# Patient Record
Sex: FEMALE | Race: WHITE | NOT HISPANIC OR LATINO | Employment: UNEMPLOYED | URBAN - METROPOLITAN AREA
[De-identification: names, ages, dates, MRNs, and addresses within clinical notes are randomized per-mention and may not be internally consistent; named-entity substitution may affect disease eponyms.]

---

## 2019-03-26 ENCOUNTER — HOSPITAL ENCOUNTER (EMERGENCY)
Facility: CLINIC | Age: 54
Discharge: HOME | End: 2019-03-26
Attending: EMERGENCY MEDICINE | Admitting: EMERGENCY MEDICINE
Payer: COMMERCIAL

## 2019-03-26 ENCOUNTER — APPOINTMENT (OUTPATIENT)
Dept: GENERAL RADIOLOGY | Facility: CLINIC | Age: 54
End: 2019-03-26
Attending: EMERGENCY MEDICINE
Payer: COMMERCIAL

## 2019-03-26 ENCOUNTER — APPOINTMENT (OUTPATIENT)
Dept: ULTRASOUND IMAGING | Facility: CLINIC | Age: 54
End: 2019-03-26
Attending: EMERGENCY MEDICINE
Payer: COMMERCIAL

## 2019-03-26 VITALS
WEIGHT: 135 LBS | OXYGEN SATURATION: 100 % | DIASTOLIC BLOOD PRESSURE: 81 MMHG | BODY MASS INDEX: 21.69 KG/M2 | SYSTOLIC BLOOD PRESSURE: 116 MMHG | HEIGHT: 66 IN | RESPIRATION RATE: 18 BRPM | TEMPERATURE: 98.1 F | HEART RATE: 72 BPM

## 2019-03-26 DIAGNOSIS — M79.671 RIGHT FOOT PAIN: ICD-10-CM

## 2019-03-26 PROCEDURE — 25000132 ZZH RX MED GY IP 250 OP 250 PS 637: Performed by: EMERGENCY MEDICINE

## 2019-03-26 PROCEDURE — 93971 EXTREMITY STUDY: CPT | Mod: RT

## 2019-03-26 PROCEDURE — 73630 X-RAY EXAM OF FOOT: CPT | Mod: RT

## 2019-03-26 PROCEDURE — 99284 EMERGENCY DEPT VISIT MOD MDM: CPT | Mod: 25

## 2019-03-26 RX ORDER — ACETAMINOPHEN 500 MG
1000 TABLET ORAL ONCE
Status: COMPLETED | OUTPATIENT
Start: 2019-03-26 | End: 2019-03-26

## 2019-03-26 RX ORDER — CEPHALEXIN 500 MG/1
500 CAPSULE ORAL 4 TIMES DAILY
Qty: 28 CAPSULE | Refills: 0 | Status: SHIPPED | OUTPATIENT
Start: 2019-03-26 | End: 2019-04-02

## 2019-03-26 RX ADMIN — ACETAMINOPHEN 1000 MG: 500 TABLET, FILM COATED ORAL at 06:13

## 2019-03-26 SDOH — HEALTH STABILITY: MENTAL HEALTH: HOW OFTEN DO YOU HAVE A DRINK CONTAINING ALCOHOL?: NEVER

## 2019-03-26 ASSESSMENT — ENCOUNTER SYMPTOMS
MYALGIAS: 0
SHORTNESS OF BREATH: 0

## 2019-03-26 ASSESSMENT — MIFFLIN-ST. JEOR: SCORE: 1244.11

## 2019-03-26 NOTE — DISCHARGE INSTRUCTIONS
Discharge Instructions  Cellulitis    Cellulitis is an infection of the skin that occurs when bacteria enter the skin.   Symptoms are generally redness, swelling, warmth and pain.  Your infection appeared to be appropriate to treat at home with antibiotics.  However, sometimes your infection may be worse than it seemed at first, or may worsen with time. If you have new or worse symptoms, you may need to be seen again in the Emergency Department or by your primary provider.    Generally, every Emergency Department visit should have a follow-up clinic visit with either a primary or a specialty clinic/provider. Please follow-up as instructed by your emergency provider today.    Return to the Emergency Department if:  The redness, pain, or swelling gets a lot worse.  If the red area was marked, return if it is red significantly beyond the marked area.  You are unable to get your antibiotics, or are vomiting (throwing up) these pills, or you cannot take them.  You are feeling more ill, weak or lightheaded.  You start to run a new fever (temperature >101 F).  Anything else about the infection worries or concerns you.  Treatment:  Start your antibiotics right away, and take them as prescribed. Be sure to finish the whole prescription, even if you are better.  Apply a heating pad, warm packs, or warm water soaks to the infected area for 15 minutes at a time, at least 3 times a day. Do not use a heating pad on your feet or legs if you have diabetes. Do not sleep with a heating pad on, since this can cause burns or skin injury.  Rest your injured area for at least 1-2 days. After that you may start using your extremity again as long as there is not too much pain.   Raise the injured area above the level of your heart as much as possible in the first 1-2 days.  Tylenol  (acetaminophen), Motrin  (ibuprofen), or Advil  (ibuprofen) may help may help reduce pain and fever and may help you feel more comfortable. Be sure to read and  follow the package directions, and ask your provider if you have questions.    If you were given a prescription for medicine here today, be sure to read all of the information (including the package insert) that comes with your prescription.  This will include important information about the medicine, its side effects, and any warnings that you need to know about.  The pharmacist who fills the prescription can provide more information and answer questions you may have about the medicine.  If you have questions or concerns that the pharmacist cannot address, please call or return to the Emergency Department.     Remember that you can always come back to the Emergency Department if you are not able to see your regular provider in the amount of time listed above, if you get any new symptoms, or if there is anything that worries you.

## 2019-03-26 NOTE — ED PROVIDER NOTES
"  History     Chief Complaint:    Foot Pain       HPI   Maria Del Carmen Wallis is an otherwise healthy 53 year old female who presents to the ED for evaluation of foot pain. The patient states that she started to develop right foot pain to the lateral dorsal aspect of the foot in conjunction very mild swelling yesterday shortly after landing in [place] on a flight from [place]. She states that it felt almost as if she tied her shoes too tight. The pain got progressively worse throughout the night and is exacerbated when she bears weight or if she touches her foot. She denies any trauma or injury to the foot, calf pain, chest pain, shortness of breath, or history of blood clots    Allergies:  Penicillins     Medications:    The patient is currently on no regular medications.    Past Medical History:    The patient denies any significant past medical history.    Past Surgical History:    Hysterectomy     Family History:    No past pertinent family history.    Social History:  Negative for tobacco use.  Negative for alcohol use.     Review of Systems   Respiratory: Negative for shortness of breath.    Cardiovascular: Negative for chest pain.   Musculoskeletal: Negative for myalgias.        Foot pain with mild swelling   All other systems reviewed and are negative.        Physical Exam   First Vitals:  BP: 116/81  Pulse: 72  Temp: 98.1  F (36.7  C)  Resp: 18  Height: 167.6 cm (5' 6\")  Weight: 61.2 kg (135 lb)  SpO2: 100 %      Physical Exam  Constitutional: The patient is oriented to person, place, and time.   HENT:   Head: Atraumatic  Right Ear: Normal  Left Ear: Normal  Nose: Nose normal.   Mouth/Throat: Oropharynx is clear and moist. No erythema or exudate.   Eyes: Conjunctivae and EOM are normal. Pupils are equal, round, and reactive to light. No discharge  Neck: Normal range of motion. Neck supple.   Cardiovascular: Normal rate, regular rhythm, no murmur gallops or rubs. Intact distal pulses.    Pulmonary/Chest: CTA " bilaterally. No wheezes rale or rhonchi.  Abdominal: Soft. Non tender.  No masses   Musculoskeletal: No edema. No bony deformity. Normal range of motion. 3 cm diameter area of faint erythema on the lateral dorsal aspect of the right foot, markedly tender, mild swelling, no real warmth, normal pulses, normal movement of the toes, no tenderness at the ankle.  Lymphadenopathy:     The patient has no cervical adenopathy.   Neurological: The patient is alert and oriented to person, place, and time. The patient has normal strength and normal reflexes. No cranial nerve deficit. Coordination normal.   Skin: Skin is warm and dry. No rash noted. The patient is not diaphoretic.   Psychiatric: The patient has a normal mood and affect.      Emergency Department Course   Imaging:  Radiographic findings were communicated with the patient who voiced understanding of the findings.  XR Foot, G/E, 3 views, right:   No fracture, dislocation, or retained radiopaque foreign  body as per radiology.     US Lower Extremity Venous Duplex, right:   No DVT demonstrated as per radiology.    Interventions:  06 Tylenol 1,000 mg PO    Emergency Department Course:  Nursing notes and vitals reviewed. (0521) I performed an exam of the patient as documented above.     Medicine administered as documented above.    The patient was sent for a foot XR and lower extremity venous duplex US while in the emergency department, findings above.     0617 I rechecked the patient and discussed the results of her workup thus far.     Findings and plan explained to the Patient. Patient discharged home with instructions regarding supportive care, medications, and reasons to return. The importance of close follow-up was reviewed. The patient was prescribed Keflex.     Impression & Plan    Medical Decision Makin year old female presents with right foot pain. Unsure as to the etiology of the patient's discomfort. She does have a small area of faint erythema that  is exquisitely tender. She does not recall and trauma to the area. XR of the foot does not show any bony abnormality. Because of her recent airline trip I did consider DVT; US is negative. At this point, she is afebrile, vital signs are normal, feel she can be safely discharged to home. Foot was ACE wrapped, will place her on crutches for the next couple of days, ice and elevated the foot. Tylenol and Motrin for pain. Because of the area of erythema and potential for early cellulitis will place her on Keflex for the next week. Follow up with PCP. Return for problems.       Diagnosis:    ICD-10-CM    1. Right foot pain M79.671        Disposition:  discharged to home    Discharge Medications:     Medication List      Started    cephALEXin 500 MG capsule  Commonly known as:  KEFLEX  500 mg, Oral, 4 TIMES DAILY          Scribe Disclosure:  IAayush, am serving as a scribe on 3/26/2019 at 5:21 AM to personally document services performed by Clive Eason MD based on my observations and the provider's statements to me.          Aayush Pollard  3/26/2019    EMERGENCY DEPARTMENT       Clive Eason MD  04/03/19 3653

## 2019-03-26 NOTE — ED AVS SNAPSHOT
Emergency Department  [address] [place] [address]  Phone:  [phone]  Fax:  [phone]                                    Maria Del Carmen Wallis   MRN: [id number]    Department:   Emergency Department   Date of Visit:  3/26/2019           After Visit Summary Signature Page    I have received my discharge instructions, and my questions have been answered. I have discussed any challenges I see with this plan with the nurse or doctor.    ..........................................................................................................................................  Patient/Patient Representative Signature      ..........................................................................................................................................  Patient Representative Print Name and Relationship to Patient    ..................................................               ................................................  Date                                   Time    ..........................................................................................................................................  Reviewed by Signature/Title    ...................................................              ..............................................  Date                                               Time          22EPIC Rev 08/18

## 2019-11-04 ENCOUNTER — HEALTH MAINTENANCE LETTER (OUTPATIENT)
Age: 54
End: 2019-11-04

## 2020-11-22 ENCOUNTER — HEALTH MAINTENANCE LETTER (OUTPATIENT)
Age: 55
End: 2020-11-22

## 2021-02-07 ENCOUNTER — HEALTH MAINTENANCE LETTER (OUTPATIENT)
Age: 56
End: 2021-02-07

## 2021-04-19 ENCOUNTER — IMMUNIZATION (OUTPATIENT)
Dept: PEDIATRICS | Facility: CLINIC | Age: 56
End: 2021-04-19
Payer: COMMERCIAL

## 2021-04-19 PROCEDURE — 91300 PR COVID VAC PFIZER DIL RECON 30 MCG/0.3 ML IM: CPT

## 2021-04-19 PROCEDURE — 0001A PR COVID VAC PFIZER DIL RECON 30 MCG/0.3 ML IM: CPT

## 2021-05-10 ENCOUNTER — IMMUNIZATION (OUTPATIENT)
Dept: PEDIATRICS | Facility: CLINIC | Age: 56
End: 2021-05-10
Attending: INTERNAL MEDICINE
Payer: COMMERCIAL

## 2021-05-10 PROCEDURE — 91300 PR COVID VAC PFIZER DIL RECON 30 MCG/0.3 ML IM: CPT

## 2021-05-10 PROCEDURE — 0002A PR COVID VAC PFIZER DIL RECON 30 MCG/0.3 ML IM: CPT

## 2021-09-18 ENCOUNTER — HEALTH MAINTENANCE LETTER (OUTPATIENT)
Age: 56
End: 2021-09-18

## 2021-09-26 ENCOUNTER — HOSPITAL ENCOUNTER (EMERGENCY)
Facility: CLINIC | Age: 56
Discharge: HOME | End: 2021-09-26
Attending: EMERGENCY MEDICINE | Admitting: EMERGENCY MEDICINE
Payer: COMMERCIAL

## 2021-09-26 ENCOUNTER — APPOINTMENT (OUTPATIENT)
Dept: GENERAL RADIOLOGY | Facility: CLINIC | Age: 56
End: 2021-09-26
Attending: EMERGENCY MEDICINE
Payer: COMMERCIAL

## 2021-09-26 VITALS
RESPIRATION RATE: 18 BRPM | BODY MASS INDEX: 21.69 KG/M2 | DIASTOLIC BLOOD PRESSURE: 69 MMHG | HEART RATE: 63 BPM | HEIGHT: 66 IN | WEIGHT: 135 LBS | TEMPERATURE: 98 F | SYSTOLIC BLOOD PRESSURE: 118 MMHG | OXYGEN SATURATION: 100 %

## 2021-09-26 DIAGNOSIS — S20.219A CONTUSION OF RIB, UNSPECIFIED LATERALITY, INITIAL ENCOUNTER: ICD-10-CM

## 2021-09-26 PROCEDURE — 71046 X-RAY EXAM CHEST 2 VIEWS: CPT

## 2021-09-26 PROCEDURE — 99283 EMERGENCY DEPT VISIT LOW MDM: CPT | Mod: 25

## 2021-09-26 RX ORDER — LIDOCAINE 50 MG/G
1 PATCH TOPICAL EVERY 24 HOURS
Qty: 7 PATCH | Refills: 0 | Status: SHIPPED | OUTPATIENT
Start: 2021-09-26 | End: 2021-12-23

## 2021-09-26 ASSESSMENT — MIFFLIN-ST. JEOR: SCORE: 1234.11

## 2021-09-26 NOTE — ED TRIAGE NOTES
Pt presents with rib pain and epigastric area- pt states on 9/11 she was running a [employer] race and was running up a half pipe and fell onto the top of the half pipe and landed hard on her rib area and having a lot of pain with any touch, movement and unable to do any physical activity due to the pain. Denies coughing up any blood

## 2021-09-26 NOTE — DISCHARGE INSTRUCTIONS
As we talked about, you can take Motrin with more frequency if you like, try 600 mg, every 6 hours as needed.  Please also use Tylenol and the lidocaine patch was given you.  Come back with any other concerns, and know that it will probably take between 4 and 6 weeks to fully heal from your contusion.

## 2021-09-26 NOTE — ED PROVIDER NOTES
"  History   Chief Complaint:  Rib Pain       HPI   Maria Del Carmen Wallis is a 55 year old female who presents with rib pain. Per the patient, on 9/11 she was running a [employer] race and ran up a half pipe. She slipped and fell onto her chest on the edge of the half pipe, and states she was hit in the ribs. She developed rib pain worsened by movement, coughing, sneezing, and deep breaths. She was attempting to wait out the pain and has been taking ibuprofen with moderate relief. While at the ER, she complains of ongoing rib pain.    Review of Systems   Musculoskeletal:        Positive for rib pain   All other systems reviewed and are negative.        Allergies:  Penicillins    Medications:  Albuterol  Flonase  Imitrex    Past Medical History:    The patient denies past medical history.    Past Surgical History:    Hysterectomy    Family History:    Mother: Breast cancer    Social History:  The patient was accompanied to the ER by her spouse  Marital Status:   The patient recently ran a [employer] [place] race    Physical Exam     Patient Vitals for the past 24 hrs:   BP Temp Pulse Resp SpO2 Height Weight   09/26/21 0913 -- -- -- -- -- 1.676 m (5' 6\") 61.2 kg (135 lb)   09/26/21 0912 118/69 98  F (36.7  C) 63 20 100 % -- --       Physical Exam  Vitals: reviewed by me  General: Pt seen on [facility], cooperative, and alert to conversation  Eyes: Tracking well, clear conjunctiva BL  ENT: MMM, midline trachea.   Lungs: No tachypnea, no accessory muscle use. No respiratory distress.   CV: Rate as above  Abd: Soft, non tender, no guarding, no rebound. Non distended  MSK: no joint effusion.  No evidence of trauma apart from a small chronic area mentioned below  Does however have focal tenderness palpation to her lowest rib on the right side, anteriorly.  Minimal ecchymotic area noted over this.  No skin breaks.  Skin: No rash  Neuro: Clear speech and no facial droop.  Psych: Not RIS, no e/o " /        Emergency Department Course     Imaging:    XR Chest 2 Views  Negative chest.  Reading per radiology.    The above imaging workup was performed.     Emergency Department Course:    Reviewed:  I reviewed nursing notes, vitals and past medical history    Assessments:  0952 I obtained history and examined the patient as noted above.     Disposition:  The patient was discharged to home.       Impression & Plan       Medical Decision Making:  Maria Del Carmen Wallis is a very pleasant 55 year old female who presents to the emergency room with what appears to be bruised ribs, specifically her lower interior ribs. She shows me a video from about two weeks ago where she fell and landed with a rather hard blunt impact on a half pipe bar. She does have some ecchymotic area over these lower ribs, as well as discrete and focal tenderness to palpation. Xray does not show any fractures. She is able to take deep breaths as long as she goes slowly, and I do think that she is stable for continued outpatient management with Tylenol and Motrin, as well as a lidocaine patch as this may help somewhat. She understands that she will likely have pain for 4-6 weeks, does feel comfortable coming back to the emergency department if anything changes, and will be discharged as above.     Diagnosis:    ICD-10-CM    1. Contusion of rib, unspecified laterality, initial encounter  S20.219A        Discharge Medications:  New Prescriptions    LIDOCAINE (LIDODERM) 5 % PATCH    Place 1 patch onto the skin every 24 hours To prevent lidocaine toxicity, patient should be patch free for 12 hrs daily.       Scribe Disclosure:  I, Basilio Mcdowell, am serving as a scribe at 9:18 AM on 9/26/2021 to document services personally performed by Bogdan Moyer MD based on my observations and the provider's statements to me.        Bogdan Moyer MD  09/26/21 1669

## 2021-12-23 ENCOUNTER — LAB (OUTPATIENT)
Dept: URGENT CARE | Facility: URGENT CARE | Age: 56
End: 2021-12-23
Attending: EMERGENCY MEDICINE
Payer: COMMERCIAL

## 2021-12-23 ENCOUNTER — OFFICE VISIT (OUTPATIENT)
Dept: URGENT CARE | Facility: URGENT CARE | Age: 56
End: 2021-12-23
Payer: COMMERCIAL

## 2021-12-23 ENCOUNTER — E-VISIT (OUTPATIENT)
Dept: URGENT CARE | Facility: CLINIC | Age: 56
End: 2021-12-23
Payer: COMMERCIAL

## 2021-12-23 VITALS
TEMPERATURE: 98.2 F | SYSTOLIC BLOOD PRESSURE: 100 MMHG | DIASTOLIC BLOOD PRESSURE: 74 MMHG | WEIGHT: 135 LBS | OXYGEN SATURATION: 100 % | RESPIRATION RATE: 16 BRPM | BODY MASS INDEX: 21.79 KG/M2 | HEART RATE: 60 BPM

## 2021-12-23 DIAGNOSIS — J06.9 VIRAL URI WITH COUGH: Primary | ICD-10-CM

## 2021-12-23 DIAGNOSIS — R05.9 COUGH: ICD-10-CM

## 2021-12-23 DIAGNOSIS — Z20.822 SUSPECTED COVID-19 VIRUS INFECTION: Primary | ICD-10-CM

## 2021-12-23 DIAGNOSIS — Z20.822 SUSPECTED COVID-19 VIRUS INFECTION: ICD-10-CM

## 2021-12-23 LAB
DEPRECATED S PYO AG THROAT QL EIA: NEGATIVE
FLUAV AG SPEC QL IA: NEGATIVE
FLUBV AG SPEC QL IA: NEGATIVE
GROUP A STREP BY PCR: NOT DETECTED

## 2021-12-23 PROCEDURE — 99421 OL DIG E/M SVC 5-10 MIN: CPT | Performed by: EMERGENCY MEDICINE

## 2021-12-23 PROCEDURE — 87804 INFLUENZA ASSAY W/OPTIC: CPT | Performed by: NURSE PRACTITIONER

## 2021-12-23 PROCEDURE — 99203 OFFICE O/P NEW LOW 30 MIN: CPT | Performed by: NURSE PRACTITIONER

## 2021-12-23 PROCEDURE — U0005 INFEC AGEN DETEC AMPLI PROBE: HCPCS

## 2021-12-23 PROCEDURE — U0003 INFECTIOUS AGENT DETECTION BY NUCLEIC ACID (DNA OR RNA); SEVERE ACUTE RESPIRATORY SYNDROME CORONAVIRUS 2 (SARS-COV-2) (CORONAVIRUS DISEASE [COVID-19]), AMPLIFIED PROBE TECHNIQUE, MAKING USE OF HIGH THROUGHPUT TECHNOLOGIES AS DESCRIBED BY CMS-2020-01-R: HCPCS

## 2021-12-23 PROCEDURE — 87651 STREP A DNA AMP PROBE: CPT | Performed by: NURSE PRACTITIONER

## 2021-12-23 NOTE — PATIENT INSTRUCTIONS
Dear Maria Del Carmen Wallis,    Your symptoms show that you may have coronavirus (COVID-19). This illness can cause fever, cough and trouble breathing. Many people get a mild case and get better on their own. Some people can get very sick.    Will I be tested for COVID-19?  We would like to test you for Covid-19 virus. I have placed orders for this test.     To schedule: go to your [employer] home page and scroll down to the section that says  You have an appointment that needs to be scheduled  and click the large green button that says  Schedule Now  and follow the steps to find the next available openings.    If you are unable to complete these [employer] scheduling steps, please call [phone] to schedule your testing.     Return to work/school/ guidance:  Please let your workplace manager and staffing office know when your quarantine ends     We can t give you an exact date as it depends on the above. You can calculate this on your own or work with your manager/staffing office to calculate this. (For example if you were exposed on 10/4, you would have to quarantine for 14 full days. That would be through 10/18. You could return on 10/19.)      If you receive a positive COVID-19 test result, follow the guidance of the those who are giving you the results. Usually the return to work is 10 (or in some cases 20 days from symptom onset.) If you work at [facility], you must also be cleared by Employee Occupational Health and Safety to return to work.        If you receive a negative COVID-19 test result and did not have a high risk exposure to someone with a known positive COVID-19 test, you can return to work once you're free of fever for 24 hours without fever-reducing medication and your symptoms are improving or resolved.      If you receive a negative COVID-19 test and If you had a high risk exposure to someone who has tested positive for COVID-19 then you can return to work 14 days after your last contact  with the positive individual    Note: If you have ongoing exposure to the covid positive person, this quarantine period may be more than 14 days. (For example, if you are continued to be exposed to your child who tested positive and cannot isolate from them, then the quarantine of 7-14 days can't start until your child is no longer contagious. This is typically 10 days from onset of the child's symptoms. So the total duration may be 17-24 days in this case.)    Sign up for [employer].   We know it's scary to hear that you might have COVID-19. We want to track your symptoms to make sure you're okay over the next 2 weeks. Please look for an email from [employer]--this is a free, online program that we'll use to keep in touch. To sign up, follow the link in the email you will receive. Learn more at [email or web address]    How can I take care of myself?    Get lots of rest. Drink extra fluids (unless a doctor has told you not to)    Take Tylenol (acetaminophen) or ibuprofen for fever or pain. If you have liver or kidney problems, ask your family doctor if it's okay to take Tylenol o ibuprofen    If you have other health problems (like cancer, heart failure, an organ transplant or severe kidney disease): Call your specialty clinic if you don't feel better in the next 2 days.    Know when to call 911. Emergency warning signs include:  o Trouble breathing or shortness of breath  o Pain or pressure in the chest that doesn't go away  o Feeling confused like you haven't felt before, or not being able to wake up  o Bluish-colored lips or face    Where can I get more information?  [facility] - About COVID-19:   [email or web address]    CDC - What to Do If You're Sick:   www.cdc.gov/coronavirus/2019-ncov/about/steps-when-sick.html    December 23, 2021  RE:  Maria Del Carmen Wallis                                                                                                                  [id number]  [place] [address]      To whom it may concern:    I evaluated Maria Del Carmen Wallis on December 23, 2021. Maria Del Carmen Wallis should be excused from work/school.     They should let their workplace manager and staffing office know when their quarantine ends.    We can not give an exact date as it depends on the information below. They can calculate this on their own or work with their manager/staffing office to calculate this. (For example if they were exposed on 10/04, they would have to quarantine for 14 full days. That would be through 10/18. They could return on 10/19.)    Quarantine Guidelines:      If patient receives a positive COVID-19 test result, they should follow the guidance of those who are giving the results. Usually the return to work is 10 (or in some cases 20 days from symptom onset.) If they work at [employer], they must be cleared by Employee Occupational Health and Safety to return to work.        If patient receives a negative COVID-19 test result and did not have a high risk exposure to someone with a known positive COVID-19 test, they can return to work once they're free of fever for 24 hours without fever-reducing medication and their symptoms are improving or resolved.      If patient receives a negative COVID-19 test and if they had a high risk exposure to someone who has tested positive for COVID-19 then they can return to work 14 days after their last contact with the positive individual    Note: If there is ongoing exposure to the covid positive person, this quarantine period may be longer than 14 days. (For example, if they are continually exposed to their child, who tested positive and cannot isolate from them, then the quarantine of 7-14 days can't start until their child is no longer contagious. This is typically 10 days from onset to the child's symptoms. So the total duration may be 17-24 days in this case.)     Sincerely,  Amrit Quinn MD

## 2021-12-23 NOTE — PROGRESS NOTES
Chief Complaint   Patient presents with     Cough     cough, body aches, sore throat, SOB X 2 days         ICD-10-CM    1. Viral URI with cough  J06.9    2. Cough  R05.9 Streptococcus A Rapid Screen w/Reflex to PCR     Influenza A/B antigen     Group A Streptococcus PCR Throat Swab   Dextromethorphan for cough (Delsym cough syrup).    Pseudoephedrine for runny nose/ congestion.    Acetaminophen (Tylenol) may be taken up to 4000mg daily (3000mg if over 65) which would be 2 regular strength tablets (325mg) or two extra strength tablets(500mg) up to 4 times a day (3 times a day if over 65).   Check for acetaminophen in other OTC or prescription medications and be sure you add this in the maximum amount you take every day.    Too much acetaminophen can lead to serious liver damage. DO NOT TAKE if you have a history of liver disease.    Ibuprofen 200mg tablets may be taken up to 4 pills 4 times a day(three times a day if over 65)  to the maximum of 3200mg,  (2400mg if >65)  daily as needed for pain.   Take with food.  Don't take with aspirin, Aleve or other antiinflammatory medication or with warfarin. DO NOT TAKE if you have a history of kidney disease.    Drink water and rest. Isolate at home until you have had symptoms for at least 10 days. Patient is instructed to go to emergency room immediately if she develops severe shortness of breath.      [facility] website for Monoclonal antibodies if test is positive.    33 minutes spent on the date of the encounter doing chart review, history and exam, documentation and further activities per the note      Results for orders placed or performed in visit on 12/23/21 (from the past 24 hour(s))   Streptococcus A Rapid Screen w/Reflex to PCR    Specimen: Throat; Swab   Result Value Ref Range    Group A Strep antigen Negative Negative   Influenza A/B antigen    Specimen: Nasopharyngeal; Swab   Result Value Ref Range    Influenza A antigen Negative Negative     Influenza B antigen Negative Negative    Narrative    Test results must be correlated with clinical data. If necessary, results should be confirmed by a molecular assay or viral culture.   Group A Streptococcus PCR Throat Swab    Specimen: Throat; Swab   Result Value Ref Range    Group A strep by PCR Not Detected Not Detected    Narrative    The Xpert Xpress Strep A test, performed on the [employer], is a rapid, qualitative in vitro diagnostic test for the detection of Streptococcus pyogenes (Group A ß-hemolytic Streptococcus, Strep A) in throat swab specimens from patients with signs and symptoms of pharyngitis. The Xpert Xpress Strep A test can be used as an aid in the diagnosis of Group A Streptococcal pharyngitis. The assay is not intended to monitor treatment for Group A Streptococcus infections. The Xpert Xpress Strep A test utilizes an automated real-time polymerase chain reaction (PCR) to detect Streptococcus pyogenes DNA.       Filomena Wallis is an 56 year old female who presents to clinic today for ST, cough, fever, headache, body aches, , runny nose, severe fatigue.     ROS: 10 point ROS neg other than the symptoms noted above in the HPI.       Objective    /74   Pulse 60   Temp 98.2  F (36.8  C) (Tympanic)   Resp 16   Wt 61.2 kg (135 lb)   SpO2 100%   BMI 21.79 kg/m    Nurses notes and VS have been reviewed.    Physical Exam       GENERAL APPEARANCE: alert and moderate distress     EYES: PERRL, EOMI, sclera non-icteric     HENT: ear canals and TM's normal, nose and mouth without ulcers or lesions, tonsillar erythema and tiny ulcerations in the back of throat.     NECK: no adenopathy or asymmetry, thyroid normal to palpation     RESP: lungs clear to auscultation - no rales, rhonchi or wheezes     CV: regular rates and rhythm, no murmurs, rubs, or gallop     ABDOMEN:  soft, nontender, no HSM or masses and bowel sounds normal     MS: extremities normal- no gross  deformities noted; normal muscle tone.     SKIN: no suspicious lesions or rashes     NEURO: Normal strength and tone, mentation intact and speech normal     PSYCH: normal thought process; no significant mood disturbance    Patient Instructions   Dextromethorphan for cough (Delsym cough syrup).    Pseudoephedrine for runny nose/ congestion.    Acetaminophen (Tylenol) may be taken up to 4000mg daily (3000mg if over 65) which would be 2 regular strength tablets (325mg) or two extra strength tablets(500mg) up to 4 times a day (3 times a day if over 65).   Check for acetaminophen in other OTC or prescription medications and be sure you add this in the maximum amount you take every day.    Too much acetaminophen can lead to serious liver damage. DO NOT TAKE if you have a history of liver disease.    Ibuprofen 200mg tablets may be taken up to 4 pills 4 times a day(three times a day if over 65)  to the maximum of 3200mg,  (2400mg if >65)  daily as needed for pain.   Take with food.  Don't take with aspirin, Aleve or other antiinflammatory medication or with warfarin. DO NOT TAKE if you have a history of kidney disease.    Drink water and rest.      [facility] website for Monoclonal antibodies.    After Your COVID-19 (Coronavirus) Test  You have been tested for COVID-19 (coronavirus).   If you'll have surgery in the next few days, we'll let you know ahead of time if you have the virus. Please call your surgeon's office with any questions.  For all other patients: Results are usually available in [employer] within 2 to 3 days.   If you do not have a [employer] account, you'll get a letter in the mail in about 7 to 10 days.   [employer] is often the fastest way to get test results. Please sign up if you do not already have a [employer] account. See the handout Getting COVID-19 Test Results in [employer] for help.  What if my test result is positive?  If your test is positive and you have not viewed your result in [employer], you'll  "get a phone call with your result. (A positive test means that you have the virus.)     Follow the tips under \"How do I self-isolate?\" below for 10 days (20 days if you have a weak immune system).    You don't need to be retested for COVID-19 before going back to school or work. As long as you're fever-free and feeling better, you can go back to school, work and other activities after waiting the 10 or 20 days.  What if I have questions after I get my results?  If you have questions about your results, please visit our testing website at [email or web address].   After 7 to 10 days, if you have not gotten your results:     Call [phone] ([phone]) and ask to speak with our COVID-19 results team.    If you're being treated at an infusion center: Call your infusion center directly.  What are the symptoms of COVID-19?  Cough, fever and trouble breathing are the most common signs of COVID-19.  Other symptoms can include new headaches, new muscle or body aches, new and unexplained fatigue (feeling very tired), chills, sore throat, congestion (stuffy or runny nose), diarrhea (loose poop), loss of taste or smell, belly pain, and nausea or vomiting (feeling sick to your stomach or throwing up).  You may already have symptoms of COVID-19, or they may show up later.  What should I do if I have symptoms?  If you're having surgery: Call your surgeon's office.  For all other patients: Stay home and away from others (self-isolate) until ...    You've had no fever--and no medicine that reduces fever--for 1 full day (24 hours), AND    Other symptoms have gotten better. For example, your cough or breathing has improved, AND    At least 10 days have passed since your symptoms first started.  How do I self-isolate?  1. Stay in your own room, even for meals. Use your own bathroom if you can.  2. Stay away from others in your home. No hugging, kissing or shaking hands. No visitors.  3. Don't go to " "work, school or anywhere else.  4. Clean \"high touch\" surfaces often (doorknobs, counters, handles). Use household cleaning spray or wipes. You'll find a full list of  on the EPA website: www.epa.gov/pesticide-registration/list-n-disinfectants-use-against-sars-cov-2.  5. Cover your mouth and nose with a mask or other face covering to avoid spreading germs.  6. Wash your hands and face often. Use soap and water.  7. Caregivers in these groups are at risk for severe illness due to COVID-19:  1. People 65 years and older  2. People who live in a nursing home or long-term care facility  3. People with chronic disease (lung, heart, cancer, diabetes, kidney, liver, immunologic)  4. People who have a weakened immune system, including those who:    Are in cancer treatment    Take medicine that weakens the immune system, such as corticosteroids    Had a bone marrow or organ transplant    Have an immune deficiency    Have poorly controlled HIV or AIDS    Are obese (body mass index of 40 or higher)    Smoke regularly  8. Caregivers should wear gloves while washing dishes, handling laundry and cleaning bedrooms and bathrooms.  9. Use caution when washing and drying laundry: Don't shake dirty laundry and use the warmest water setting that you can.  10. For more tips on managing your health at home, go to www.cdc.gov/coronavirus/2019-ncov/downloads/10Things.pdf.  How can I take care of myself at home?  1. Get lots of rest. Drink extra fluids (unless a doctor has told you not to).  2. Take Tylenol (acetaminophen) for fever or pain. If you have liver or kidney problems, ask your family doctor if it's OK to take Tylenol.   Adults can take either:  ? 650 mg (two 325 mg pills) every 4 to 6 hours, or   ? 1,000 mg (two 500 mg pills) every 8 hours as needed.  ? Note: Don't take more than 3,000 mg in one day. Acetaminophen is found in many medicines (both prescribed and over-the-counter medicines). Read all labels to be sure you " don't take too much.   For children, check the Tylenol bottle for the right dose. The dose is based on the child's age or weight.  3. If you have other health problems (like cancer, heart failure, an organ transplant or severe kidney disease): Call your specialty clinic if you don't feel better in the next 2 days.  4. Know when to call 911. Emergency warning signs include:  ? Trouble breathing or shortness of breath  ? Chest pain or pressure that doesn't go away  ? Feeling confused like you haven't felt before, or not being able to wake up  ? Bluish-colored lips or face  5. If your doctor prescribed a blood thinner medicine: Follow their instructions.  Where can I get more information?  1. [facility] - About COVID-19:   [email or web address]  2. CDC - If You're Sick: cdc.gov/coronavirus/2019-ncov/about/steps-when-sick.html  3. CDC - Ending Home Isolation: www.cdc.gov/coronavirus/2019-ncov/hcp/disposition-in-home-patients.html  4. [facility] - Caring for Someone: www.cdc.gov/coronavirus/2019-ncov/if-you-are-sick/care-for-someone.html  5. [facility] - Interim Guidance for Hospital Discharge to Home: www.health.[facility].[place]/diseases/coronavirus/hcp/hospdischarge.pdf  6. [facility] clinical trials (COVID-19 research studies): clinicalaffairs.[facility]-clinical-trials  7. Below are the COVID-19 hotlines at the [facility] of Health ([facility]). Interpreters are available.  ? For health questions: Call 988-970-8120 or [phone] (7 a.m. to 7 p.m.)  ? For questions about schools and childcare: Call 087-119-3425 or [phone] (7 a.m. to 7 p.m.)    For informational purposes only. Not to replace the advice of your health care provider. Clinically reviewed by Infection Prevention and the [facility] COVID-19 Clinical Team. Copyright   2020 [employer]. All rights reserved. [employer] 044769 - Rev 11/11/20.               BUDDY Delcid, CNP  [facility]  Provider    The use of Dragon/[employer] dictation services may have been used to construct the content in this note; any grammatical or spelling errors are non-intentional. Please contact the author of this note directly if you are in need of any clarification.

## 2021-12-23 NOTE — PATIENT INSTRUCTIONS
"Dextromethorphan for cough (Delsym cough syrup).    Pseudoephedrine for runny nose/ congestion.    Acetaminophen (Tylenol) may be taken up to 4000mg daily (3000mg if over 65) which would be 2 regular strength tablets (325mg) or two extra strength tablets(500mg) up to 4 times a day (3 times a day if over 65).   Check for acetaminophen in other OTC or prescription medications and be sure you add this in the maximum amount you take every day.    Too much acetaminophen can lead to serious liver damage. DO NOT TAKE if you have a history of liver disease.    Ibuprofen 200mg tablets may be taken up to 4 pills 4 times a day(three times a day if over 65)  to the maximum of 3200mg,  (2400mg if >65)  daily as needed for pain.   Take with food.  Don't take with aspirin, Aleve or other antiinflammatory medication or with warfarin. DO NOT TAKE if you have a history of kidney disease.    Drink water and rest.      [facility] website for Monoclonal antibodies.    After Your COVID-19 (Coronavirus) Test  You have been tested for COVID-19 (coronavirus).   If you'll have surgery in the next few days, we'll let you know ahead of time if you have the virus. Please call your surgeon's office with any questions.  For all other patients: Results are usually available in [employer] within 2 to 3 days.   If you do not have a [employer] account, you'll get a letter in the mail in about 7 to 10 days.   [employer] is often the fastest way to get test results. Please sign up if you do not already have a [employer] account. See the handout Getting COVID-19 Test Results in [employer] for help.  What if my test result is positive?  If your test is positive and you have not viewed your result in [employer], you'll get a phone call with your result. (A positive test means that you have the virus.)     Follow the tips under \"How do I self-isolate?\" below for 10 days (20 days if you have a weak immune system).    You don't need to be retested for COVID-19 " "before going back to school or work. As long as you're fever-free and feeling better, you can go back to school, work and other activities after waiting the 10 or 20 days.  What if I have questions after I get my results?  If you have questions about your results, please visit our testing website at [email or web address].   After 7 to 10 days, if you have not gotten your results:     Call [phone] ([phone]) and ask to speak with our COVID-19 results team.    If you're being treated at an infusion center: Call your infusion center directly.  What are the symptoms of COVID-19?  Cough, fever and trouble breathing are the most common signs of COVID-19.  Other symptoms can include new headaches, new muscle or body aches, new and unexplained fatigue (feeling very tired), chills, sore throat, congestion (stuffy or runny nose), diarrhea (loose poop), loss of taste or smell, belly pain, and nausea or vomiting (feeling sick to your stomach or throwing up).  You may already have symptoms of COVID-19, or they may show up later.  What should I do if I have symptoms?  If you're having surgery: Call your surgeon's office.  For all other patients: Stay home and away from others (self-isolate) until ...    You've had no fever--and no medicine that reduces fever--for 1 full day (24 hours), AND    Other symptoms have gotten better. For example, your cough or breathing has improved, AND    At least 10 days have passed since your symptoms first started.  How do I self-isolate?  1. Stay in your own room, even for meals. Use your own bathroom if you can.  2. Stay away from others in your home. No hugging, kissing or shaking hands. No visitors.  3. Don't go to work, school or anywhere else.  4. Clean \"high touch\" surfaces often (doorknobs, counters, handles). Use household cleaning spray or wipes. You'll find a full list of  on the EPA website: " www.epa.gov/pesticide-registration/list-n-disinfectants-use-against-sars-cov-2.  5. Cover your mouth and nose with a mask or other face covering to avoid spreading germs.  6. Wash your hands and face often. Use soap and water.  7. Caregivers in these groups are at risk for severe illness due to COVID-19:  1. People 65 years and older  2. People who live in a nursing home or long-term care facility  3. People with chronic disease (lung, heart, cancer, diabetes, kidney, liver, immunologic)  4. People who have a weakened immune system, including those who:    Are in cancer treatment    Take medicine that weakens the immune system, such as corticosteroids    Had a bone marrow or organ transplant    Have an immune deficiency    Have poorly controlled HIV or AIDS    Are obese (body mass index of 40 or higher)    Smoke regularly  8. Caregivers should wear gloves while washing dishes, handling laundry and cleaning bedrooms and bathrooms.  9. Use caution when washing and drying laundry: Don't shake dirty laundry and use the warmest water setting that you can.  10. For more tips on managing your health at home, go to www.cdc.gov/coronavirus/2019-ncov/downloads/10Things.pdf.  How can I take care of myself at home?  1. Get lots of rest. Drink extra fluids (unless a doctor has told you not to).  2. Take Tylenol (acetaminophen) for fever or pain. If you have liver or kidney problems, ask your family doctor if it's OK to take Tylenol.   Adults can take either:  ? 650 mg (two 325 mg pills) every 4 to 6 hours, or   ? 1,000 mg (two 500 mg pills) every 8 hours as needed.  ? Note: Don't take more than 3,000 mg in one day. Acetaminophen is found in many medicines (both prescribed and over-the-counter medicines). Read all labels to be sure you don't take too much.   For children, check the Tylenol bottle for the right dose. The dose is based on the child's age or weight.  3. If you have other health problems (like cancer, heart failure,  an organ transplant or severe kidney disease): Call your specialty clinic if you don't feel better in the next 2 days.  4. Know when to call 911. Emergency warning signs include:  ? Trouble breathing or shortness of breath  ? Chest pain or pressure that doesn't go away  ? Feeling confused like you haven't felt before, or not being able to wake up  ? Bluish-colored lips or face  5. If your doctor prescribed a blood thinner medicine: Follow their instructions.  Where can I get more information?  1. [facility] - About COVID-19:   [email or web address]  2. CDC - If You're Sick: cdc.gov/coronavirus/2019-ncov/about/steps-when-sick.html  3. [facility] - Ending Home Isolation: www.cdc.gov/coronavirus/2019-ncov/hcp/disposition-in-home-patients.html  4. [facility] - Caring for Someone: www.cdc.gov/coronavirus/2019-ncov/if-you-are-sick/care-for-someone.html  5. [facility] - Interim Guidance for Hospital Discharge to Home: www.health.[facility].[place]/diseases/coronavirus/hcp/hospdischarge.pdf  6. [facility] clinical trials (COVID-19 research studies): clinicalaffairs.[facility]-clinical-trials  7. Below are the COVID-19 hotlines at the [place] [facility] of [facility] ([facility]). Interpreters are available.  ? For health questions: Call 512-497-3680 or [phone] (7 a.m. to 7 p.m.)  ? For questions about schools and childcare: Call 795-504-7808 or 1-294.103.5475 (7 a.m. to 7 p.m.)    For informational purposes only. Not to replace the advice of your health care provider. Clinically reviewed by Infection Prevention and the [facility] COVID-19 Clinical Team. Copyright   2020 [employer]. All rights reserved. [employer] 297757 - Rev 11/11/20.

## 2021-12-24 ENCOUNTER — TELEPHONE (OUTPATIENT)
Dept: URGENT CARE | Facility: URGENT CARE | Age: 56
End: 2021-12-24
Payer: COMMERCIAL

## 2021-12-24 LAB — SARS-COV-2 RNA RESP QL NAA+PROBE: POSITIVE

## 2021-12-24 NOTE — TELEPHONE ENCOUNTER
Coronavirus (COVID-19) Notification    Reason for call  Notify of POSITIVE  COVID-19 lab result, assess symptoms,  review [facility] recommendations    Lab Result   Lab test for 2019-nCoV rRt-PCR or SARS-COV-2 PCR  Oropharyngeal AND/OR nasopharyngeal swabs were POSITIVE for 2019-nCoV RNA [OR] SARS-COV-2 RNA (COVID-19) RNA     We have been unable to reach Patient by phone at this time to notify of their Positive COVID-19 result.  Left voicemail message requesting a call back to [phone] [facility] for results.        POSITIVE COVID-19 Letter sent.    Chana Silvestre LPN

## 2022-01-08 ENCOUNTER — HEALTH MAINTENANCE LETTER (OUTPATIENT)
Age: 57
End: 2022-01-08

## 2022-03-05 ENCOUNTER — HEALTH MAINTENANCE LETTER (OUTPATIENT)
Age: 57
End: 2022-03-05

## 2022-11-20 ENCOUNTER — HEALTH MAINTENANCE LETTER (OUTPATIENT)
Age: 57
End: 2022-11-20

## 2023-04-15 ENCOUNTER — HEALTH MAINTENANCE LETTER (OUTPATIENT)
Age: 58
End: 2023-04-15

## 2023-07-24 ENCOUNTER — LAB REQUISITION (OUTPATIENT)
Dept: LAB | Facility: CLINIC | Age: 58
End: 2023-07-24
Payer: COMMERCIAL

## 2023-07-24 DIAGNOSIS — L08.9 LOCAL INFECTION OF THE SKIN AND SUBCUTANEOUS TISSUE, UNSPECIFIED: ICD-10-CM

## 2023-07-24 PROCEDURE — 87070 CULTURE OTHR SPECIMN AEROBIC: CPT | Mod: ORL | Performed by: DERMATOLOGY

## 2023-07-26 LAB — BACTERIA WND CULT: NORMAL

## 2023-09-09 ENCOUNTER — HOSPITAL ENCOUNTER (EMERGENCY)
Facility: CLINIC | Age: 58
Discharge: HOME | End: 2023-09-09
Attending: PHYSICIAN ASSISTANT | Admitting: PHYSICIAN ASSISTANT
Payer: COMMERCIAL

## 2023-09-09 ENCOUNTER — APPOINTMENT (OUTPATIENT)
Dept: MRI IMAGING | Facility: CLINIC | Age: 58
End: 2023-09-09
Attending: PHYSICIAN ASSISTANT
Payer: COMMERCIAL

## 2023-09-09 ENCOUNTER — APPOINTMENT (OUTPATIENT)
Dept: GENERAL RADIOLOGY | Facility: CLINIC | Age: 58
End: 2023-09-09
Attending: PHYSICIAN ASSISTANT
Payer: COMMERCIAL

## 2023-09-09 VITALS
TEMPERATURE: 97.4 F | HEART RATE: 85 BPM | RESPIRATION RATE: 20 BRPM | DIASTOLIC BLOOD PRESSURE: 73 MMHG | OXYGEN SATURATION: 97 % | SYSTOLIC BLOOD PRESSURE: 110 MMHG

## 2023-09-09 DIAGNOSIS — K59.00 CONSTIPATION, UNSPECIFIED CONSTIPATION TYPE: ICD-10-CM

## 2023-09-09 DIAGNOSIS — R42 DIZZINESS: ICD-10-CM

## 2023-09-09 LAB
ALBUMIN SERPL BCG-MCNC: 4.9 G/DL (ref 3.5–5.2)
ALP SERPL-CCNC: 94 U/L (ref 35–104)
ALT SERPL W P-5'-P-CCNC: 31 U/L (ref 0–50)
ANION GAP SERPL CALCULATED.3IONS-SCNC: 12 MMOL/L (ref 7–15)
AST SERPL W P-5'-P-CCNC: 36 U/L (ref 0–45)
BASOPHILS # BLD AUTO: 0 10E3/UL (ref 0–0.2)
BASOPHILS NFR BLD AUTO: 1 %
BILIRUB DIRECT SERPL-MCNC: <0.2 MG/DL (ref 0–0.3)
BILIRUB SERPL-MCNC: 0.6 MG/DL
BUN SERPL-MCNC: 10.7 MG/DL (ref 6–20)
CALCIUM SERPL-MCNC: 9.2 MG/DL (ref 8.6–10)
CHLORIDE SERPL-SCNC: 99 MMOL/L (ref 98–107)
CREAT SERPL-MCNC: 0.81 MG/DL (ref 0.51–0.95)
DEPRECATED HCO3 PLAS-SCNC: 25 MMOL/L (ref 22–29)
EGFRCR SERPLBLD CKD-EPI 2021: 85 ML/MIN/1.73M2
EOSINOPHIL # BLD AUTO: 0 10E3/UL (ref 0–0.7)
EOSINOPHIL NFR BLD AUTO: 1 %
ERYTHROCYTE [DISTWIDTH] IN BLOOD BY AUTOMATED COUNT: 12.7 % (ref 10–15)
GLUCOSE SERPL-MCNC: 101 MG/DL (ref 70–99)
HCT VFR BLD AUTO: 42.2 % (ref 35–47)
HGB BLD-MCNC: 13.5 G/DL (ref 11.7–15.7)
HOLD SPECIMEN: NORMAL
HOLD SPECIMEN: NORMAL
IMM GRANULOCYTES # BLD: 0 10E3/UL
IMM GRANULOCYTES NFR BLD: 0 %
LIPASE SERPL-CCNC: 31 U/L (ref 13–60)
LYMPHOCYTES # BLD AUTO: 1.9 10E3/UL (ref 0.8–5.3)
LYMPHOCYTES NFR BLD AUTO: 50 %
MCH RBC QN AUTO: 27.9 PG (ref 26.5–33)
MCHC RBC AUTO-ENTMCNC: 32 G/DL (ref 31.5–36.5)
MCV RBC AUTO: 87 FL (ref 78–100)
MONOCYTES # BLD AUTO: 0.2 10E3/UL (ref 0–1.3)
MONOCYTES NFR BLD AUTO: 6 %
NEUTROPHILS # BLD AUTO: 1.6 10E3/UL (ref 1.6–8.3)
NEUTROPHILS NFR BLD AUTO: 42 %
NRBC # BLD AUTO: 0 10E3/UL
NRBC BLD AUTO-RTO: 0 /100
PLATELET # BLD AUTO: 312 10E3/UL (ref 150–450)
POTASSIUM SERPL-SCNC: 3.9 MMOL/L (ref 3.4–5.3)
PROT SERPL-MCNC: 7.5 G/DL (ref 6.4–8.3)
RBC # BLD AUTO: 4.84 10E6/UL (ref 3.8–5.2)
SODIUM SERPL-SCNC: 136 MMOL/L (ref 136–145)
WBC # BLD AUTO: 3.9 10E3/UL (ref 4–11)

## 2023-09-09 PROCEDURE — 258N000003 HC RX IP 258 OP 636: Performed by: PHYSICIAN ASSISTANT

## 2023-09-09 PROCEDURE — 36415 COLL VENOUS BLD VENIPUNCTURE: CPT | Performed by: EMERGENCY MEDICINE

## 2023-09-09 PROCEDURE — 70553 MRI BRAIN STEM W/O & W/DYE: CPT

## 2023-09-09 PROCEDURE — A9585 GADOBUTROL INJECTION: HCPCS | Performed by: PHYSICIAN ASSISTANT

## 2023-09-09 PROCEDURE — 250N000011 HC RX IP 250 OP 636: Performed by: PHYSICIAN ASSISTANT

## 2023-09-09 PROCEDURE — 93005 ELECTROCARDIOGRAM TRACING: CPT

## 2023-09-09 PROCEDURE — 96374 THER/PROPH/DIAG INJ IV PUSH: CPT | Mod: 59

## 2023-09-09 PROCEDURE — 96361 HYDRATE IV INFUSION ADD-ON: CPT

## 2023-09-09 PROCEDURE — 80053 COMPREHEN METABOLIC PANEL: CPT | Performed by: EMERGENCY MEDICINE

## 2023-09-09 PROCEDURE — 83690 ASSAY OF LIPASE: CPT | Performed by: PHYSICIAN ASSISTANT

## 2023-09-09 PROCEDURE — 99285 EMERGENCY DEPT VISIT HI MDM: CPT | Mod: 25

## 2023-09-09 PROCEDURE — 250N000013 HC RX MED GY IP 250 OP 250 PS 637: Performed by: PHYSICIAN ASSISTANT

## 2023-09-09 PROCEDURE — 96375 TX/PRO/DX INJ NEW DRUG ADDON: CPT

## 2023-09-09 PROCEDURE — 255N000002 HC RX 255 OP 636: Performed by: PHYSICIAN ASSISTANT

## 2023-09-09 PROCEDURE — 70544 MR ANGIOGRAPHY HEAD W/O DYE: CPT

## 2023-09-09 PROCEDURE — 85025 COMPLETE CBC W/AUTO DIFF WBC: CPT | Performed by: EMERGENCY MEDICINE

## 2023-09-09 PROCEDURE — 85025 COMPLETE CBC W/AUTO DIFF WBC: CPT | Performed by: PHYSICIAN ASSISTANT

## 2023-09-09 PROCEDURE — 74019 RADEX ABDOMEN 2 VIEWS: CPT

## 2023-09-09 PROCEDURE — 70549 MR ANGIOGRAPH NECK W/O&W/DYE: CPT

## 2023-09-09 PROCEDURE — 80048 BASIC METABOLIC PNL TOTAL CA: CPT | Performed by: PHYSICIAN ASSISTANT

## 2023-09-09 PROCEDURE — 82248 BILIRUBIN DIRECT: CPT | Performed by: PHYSICIAN ASSISTANT

## 2023-09-09 RX ORDER — SODIUM PHOSPHATE, DIBASIC AND SODIUM PHOSPHATE, MONOBASIC 3.5; 9.5 G/66ML; G/66ML
1 ENEMA RECTAL DAILY PRN
Qty: 3 ENEMA | Refills: 0 | Status: SHIPPED | OUTPATIENT
Start: 2023-09-09

## 2023-09-09 RX ORDER — MECLIZINE HYDROCHLORIDE 25 MG/1
50 TABLET ORAL 3 TIMES DAILY PRN
Qty: 21 TABLET | Refills: 0 | Status: SHIPPED | OUTPATIENT
Start: 2023-09-09 | End: 2023-09-16

## 2023-09-09 RX ORDER — PROCHLORPERAZINE MALEATE 10 MG
10 TABLET ORAL EVERY 6 HOURS PRN
Qty: 15 TABLET | Refills: 0 | Status: SHIPPED | OUTPATIENT
Start: 2023-09-09 | End: 2023-09-14

## 2023-09-09 RX ORDER — DIPHENHYDRAMINE HYDROCHLORIDE 50 MG/ML
50 INJECTION INTRAMUSCULAR; INTRAVENOUS ONCE
Status: COMPLETED | OUTPATIENT
Start: 2023-09-09 | End: 2023-09-09

## 2023-09-09 RX ORDER — GADOBUTROL 604.72 MG/ML
10 INJECTION INTRAVENOUS ONCE
Status: COMPLETED | OUTPATIENT
Start: 2023-09-09 | End: 2023-09-09

## 2023-09-09 RX ORDER — MECLIZINE HYDROCHLORIDE 25 MG/1
50 TABLET ORAL ONCE
Status: COMPLETED | OUTPATIENT
Start: 2023-09-09 | End: 2023-09-09

## 2023-09-09 RX ADMIN — PROCHLORPERAZINE EDISYLATE 10 MG: 5 INJECTION INTRAMUSCULAR; INTRAVENOUS at 14:04

## 2023-09-09 RX ADMIN — MECLIZINE HYDROCHLORIDE 50 MG: 25 TABLET ORAL at 13:55

## 2023-09-09 RX ADMIN — DIPHENHYDRAMINE HYDROCHLORIDE 50 MG: 50 INJECTION INTRAMUSCULAR; INTRAVENOUS at 14:04

## 2023-09-09 RX ADMIN — SODIUM CHLORIDE 1000 ML: 9 INJECTION, SOLUTION INTRAVENOUS at 13:58

## 2023-09-09 RX ADMIN — GADOBUTROL 10 ML: 604.72 INJECTION INTRAVENOUS at 14:35

## 2023-09-09 ASSESSMENT — ACTIVITIES OF DAILY LIVING (ADL): ADLS_ACUITY_SCORE: 35

## 2023-09-09 NOTE — ED TRIAGE NOTES
Patient having dizziness. Denies it as the room is spinning with position changes. Had similar episode years ago, but this is much more severe. Constipated as well.

## 2023-09-09 NOTE — ED NOTES
Dizziness and vomiting that started last night. Currently denies dizziness and vomiting. Has hx of vertigo but it was years ago. Pt currently sleepy r/t benadryl.  at bedside.

## 2023-09-09 NOTE — ED PROVIDER NOTES
"    History     Chief Complaint:  Dizziness       HPI   Maria Del Carmen Wallis is a 57 year old otherwise healthy female who presents with nausea and dizzy which began around 0600 yesterday when she went from her bed to the bathroom.  She notes when she stood up got up out of bed and turned her head that is when the dizziness started before she got to the bathroom.  She did not have any syncopal episode.  Patient reports she has been having nausea without vomiting.  No preceding headache numbness, chest pain, shortness of breath, vision changes drooling, or weakness or fevers, bloody stools, or new pain, one sided weakness or tingling.  .  It went completely away in the afternoon, but came back again at 0700 this morning. She describes the dizziness as a \"room spinning\" sensation as well as she is going to pass out. The room spinning for minutes to hours at its most severe point. The patient reports experiencing some double vision with her dizziness at times, however not currently present.  Patient reports that she has had a history and previous bouts of BPPV.  She reports she has tried Epley maneuvers at home in the past without relief.    Of note, the patient had a UTI symptoms last November or December and had recurrent symptoms since then. It was discovered that she has masses in her kidney.  Patient had outpatient testing with noncontrast CT scan of the abdomen (7/26/2023) and (8/3/2023 )MR of the abdomen with and without contrast to work this up.  On her last appointment from 08/30/23 she was told that she is constipated.  No evidence of bowel obstruction on these images.  Patient reports she has been taking daily MiraLAX at a time.  She does have small hard stools at times and continues to pass gas however she feels like she is not completely evacuate her bowels.  No significant abdominal pain, vomiting, flank pain, hematuria.      MR Abdomen w/o & w Contrast  Order: [id number]  Impression    COMPARISON:  " None.    TECHNIQUE:  Multiplanar, multisequence MR images of the abdomen were obtained before and following the administration of 7 mL GADOBUTROL 1 MMOL/ML IV SOLN with dynamic acquisition.    FINDINGS: No focal finding is evident at the area of interest in the lower pole of the right kidney to correspond to the 1.0 x 0.5 cm wedge-shaped slightly hyperdense area. A 0.5 cm hypodensity elsewhere in the lower pole of the right kidney is consistent with a fat-containing lesion by MRI, and is consistent with a tiny benign angiomyolipoma. There is a subcentimeter nonenhancing cyst in the upper pole of the right kidney. No evidence of a suspicious renal mass.    Subcentimeter subcapsular T2 hyperintensity in the right lobe of the liver shows discontinuous peripheral nodular enhancement with progressive fill-in consistent with a benign cavernous hemangioma. No suspicious focal liver lesion. Unremarkable liver contour. Portal and hepatic veins are patent. There is no biliary dilatation.    The adrenal glands, spleen, and pancreas are unremarkable. No enlarged abdominal or upper retroperitoneal lymph nodes, where seen.    IMPRESSION:    1. Subcentimeter benign cysts and angiomyolipomas in the right kidney as above.  2. No evidence for a suspicious renal mass.  3. Subcentimeter subcapsular benign cavernous hemangioma in the right lobe of the liver.  Exam End: 08/02/23  5:43 PM    Specimen Collected: 08/02/23  5:13 PM Last Resulted: 08/03/23  8:23 AM   Received From: [facility]          CT Abd Pelvis WO IV Cont Stone  Order: [id number]  Impression    COMPARISON:  None available.    TECHNIQUE:  Images were obtained through the abdomen and pelvis without contrast using a renal stone protocol.    FINDINGS:    LOWER CHEST: Minimal anteroinferior pericardial fluid.    BONES: Lower finding.    ABDOMEN/PELVIS: Unremarkable appearance of the liver, gallbladder, spleen, pancreas, adrenal glands, left kidney, and appendix. 10 x 5 mm  wedge-shaped mildly hyperdense area in the right lower pole renal cortex (axial image 42). Also in the right lower renal pole, there is a 3 mm round fat density lesion (coronal image 55). Hysterectomy. No adnexal mass. Moderate large colonic stool burden. No bowel obstruction or acute inflammatory change. Normal caliber aorta. No free fluid, free air, or bulky lymphadenopathy.    IMPRESSION:  1. No urinary tract calculus. No acute finding identified in the abdomen or pelvis.  2. Moderate to large colonic stool burden.  3. 10 x 5 mm wedge-shaped mildly hyperdense area in the right lower pole renal cortex is most likely benign, possibly a hemorrhagic/proteinaceous cyst or lipid poor angiomyolipoma. Additional 5 mm lipid rich angiomyolipoma in the right lower renal pole.  Exam End: 07/26/23  3:10 PM           Independent Historian:    The patient provided the history noted above.    Review of External Notes:  Urology note    Medications:    Cipro  Myrbetriq  Macrobid  Gemtesa    Past Medical History:    Migraine  Scapular dyskinesis    Past Surgical History:    Hysterectomy  Neuroma surgery, left foot  [place] teeth extraction     Physical Exam   Patient Vitals for the past 24 hrs:   BP Temp Temp src Pulse Resp SpO2   09/09/23 1056 119/85 97.4  F (36.3  C) Temporal 55 20 100 %      Physical Exam  Vitals and nursing note reviewed.   Constitutional:       Appearance: She is not diaphoretic.   HENT:      Head: Atraumatic.      Nose: Nose normal.      Mouth/Throat:      Mouth: Mucous membranes are moist.      Pharynx: Oropharynx is clear.   Eyes:      General: Lids are normal. No scleral icterus.     Conjunctiva/sclera: Conjunctivae normal.      Pupils: Pupils are equal, round, and reactive to light.   Cardiovascular:      Rate and Rhythm: Normal rate and regular rhythm.      Heart sounds: Normal heart sounds. No murmur heard.     No friction rub. No gallop.   Pulmonary:      Effort: No respiratory distress.      Breath  sounds: Normal breath sounds. No stridor. No wheezing, rhonchi or rales.   Abdominal:      General: There is no distension.      Palpations: Abdomen is soft.      Tenderness: There is no abdominal tenderness. There is no right CVA tenderness, left CVA tenderness or guarding.   Musculoskeletal:         General: No swelling.   Skin:     General: Skin is warm.      Coloration: Skin is not jaundiced.      Findings: No erythema.   Neurological:      Mental Status: She is alert and oriented to person, place, and time. Mental status is at baseline.      GCS: GCS eye subscore is 4. GCS verbal subscore is 5. GCS motor subscore is 6.      Cranial Nerves: No cranial nerve deficit, dysarthria or facial asymmetry.      Sensory: Sensation is intact. No sensory deficit.      Motor: Motor function is intact. No weakness.      Coordination: Coordination is intact. Romberg sign negative. Coordination normal. Finger-Nose-Finger Test normal.      Gait: Gait is intact. Gait normal.      Comments: Head thrust test: No nystagmus but patient did reported provoking spinning sensation.  Cover-uncover test: Negative for skew.    Myotomes L1-S1 intact bilaterally 5-5 strength, C5-T1 intact bilaterally 5-5 strength.   Psychiatric:         Attention and Perception: Attention and perception normal.         Mood and Affect: Mood and affect normal.         Behavior: Behavior normal.         Thought Content: Thought content normal.           Emergency Department Course   ECG  ECG taken at 1320, ECG read at 1324  Sinus bradycardia  Septal infarct, age undetermined  Abnormal ECG   Rate 51 bpm. [place] interval 202 ms. QRS duration 82 ms. QT/QTc 492/453 ms. P-R-T axes 52 80 65.    Imaging:  Abdomen XR, 2 vw, flat and upright   Final Result   IMPRESSION: No free air. Nonobstructive bowel gas pattern. Large amount of formed stool throughout the colon.      MR Brain w/o & w Contrast   Preliminary Result   IMPRESSION:   HEAD MRI:    1.  No acute infarct, mass,  hemorrhage, or herniation.   2.  A few nonspecific white matter changes most likely due to chronic microvascular ischemic disease.      HEAD MRA:    1.  Normal MRA [place].      NECK MRA:   1.  Normal neck MRA.            MRA Neck (Carotids) wo & w Contrast   Preliminary Result   IMPRESSION:   HEAD MRI:    1.  No acute infarct, mass, hemorrhage, or herniation.   2.  A few nonspecific white matter changes most likely due to chronic microvascular ischemic disease.      HEAD MRA:    1.  Normal MRA [place].      NECK MRA:   1.  Normal neck MRA.            MRA Brain ([place]) wo Contrast   Preliminary Result   IMPRESSION:   HEAD MRI:    1.  No acute infarct, mass, hemorrhage, or herniation.   2.  A few nonspecific white matter changes most likely due to chronic microvascular ischemic disease.      HEAD MRA:    1.  Normal MRA [place].      NECK MRA:   1.  Normal neck MRA.              Report per radiology    Laboratory:  Labs Ordered and Resulted from Time of ED Arrival to Time of ED Departure   BASIC METABOLIC PANEL - Abnormal       Result Value    Sodium 136      Potassium 3.9      Chloride 99      Carbon Dioxide (CO2) 25      Anion Gap 12      Urea Nitrogen 10.7      Creatinine 0.81      Calcium 9.2      Glucose 101 (*)     GFR Estimate 85     CBC WITH PLATELETS AND DIFFERENTIAL - Abnormal    WBC Count 3.9 (*)     RBC Count 4.84      Hemoglobin 13.5      Hematocrit 42.2      MCV 87      MCH 27.9      MCHC 32.0      RDW 12.7      Platelet Count 312      % Neutrophils 42      % Lymphocytes 50      % Monocytes 6      % Eosinophils 1      % Basophils 1      % Immature Granulocytes 0      NRBCs per 100 WBC 0      Absolute Neutrophils 1.6      Absolute Lymphocytes 1.9      Absolute Monocytes 0.2      Absolute Eosinophils 0.0      Absolute Basophils 0.0      Absolute Immature Granulocytes 0.0      Absolute NRBCs 0.0     HEPATIC FUNCTION PANEL - Normal    Protein Total 7.5      Albumin 4.9       Bilirubin Total 0.6      Alkaline Phosphatase 94      AST 36      ALT 31      Bilirubin Direct <0.20     LIPASE - Normal    Lipase 31        Emergency Department Course & Assessments:       Interventions:  Medications   0.9% sodium chloride BOLUS (1,000 mLs Intravenous $New Bag 9/9/23 1358)   meclizine (ANTIVERT) tablet 50 mg (50 mg Oral $Given 9/9/23 1355)   prochlorperazine (COMPAZINE) injection 10 mg (10 mg Intravenous $Given 9/9/23 1404)   diphenhydrAMINE (BENADRYL) injection 50 mg (50 mg Intravenous $Given 9/9/23 1404)   sodium chloride (PF) 0.9% PF flush 60 mL (100 mLs Intravenous $Given 9/9/23 1435)   gadobutrol (GADAVIST) injection 10 mL (10 mLs Intravenous $Given 9/9/23 1435)      Assessments:  1333 I obtained history and examined the patient as noted above.   1352 I rechecked and updated the patient.   1546 I rechecked and updated the patient. The patient is feeling better and her dizziness has gotten better. She is not interested in any CT imaging.  1609 I rechecked and updated the patient. I deemed the patient safe to discharge home.    Independent Interpretation (X-rays, CTs, rhythm strip):  None    Consultations/Discussion of Management or Tests:  None       Social Determinants of Health affecting care:  None     Disposition:  The patient was discharged to home.     Impression & Plan    CMS Diagnoses: None    Medical Decision Making:  This is a 57-year-old female that presents    Maria Del Carmen SHAY Wallis is a 57 year old female who presents for evaluation of vertigo. The differential diagnosis of vertigo is broad and includes common etiologies such as menieres disease, labyrinthitis, benign positional vertigo, otitis media, etc.  More serious etiologies considered include central etiologies such as tumor, intracerebral bleed, dissection, ischemic cerebral vascular accident.  The history, physical exam including detailed neurologic exam, and workup in the emergency room suggests a benign cause of vertigo  today.  However given the report of intermittent double vision I did recommend MRI imaging.  MRI imaging of the head and neck and brain were negative for CVA or vertebral dissections.  Patient feels improved after interventions noted above. Further outpatient management is indicated with vertigo medications.       Vertigo precautions given for home.     Regarding the patient's constipation.  At this time I have low suspicion for obstruction.  Patient has a benign abdominal exam.  X-ray imaging was obtained showing retained stool without obstructive gas pattern.  I recommend trial of enemas at home milk of magnesia and increasing her MiraLAX.  I also recommend close primary care follow-up for further discussions and management of this constipation.  I did discuss we could potentially proceed with CT imaging to reevaluate for obstruction however the patient declined this option at this time.  The patient was feeling much better with interventions above and the patient was discharged home in the care of her .    The patient understands if she develops worsening symptoms, new symptoms such as numbness, weakness, worsening pain, vomiting or worsening condition to return back to the emergency department.        Diagnosis:    ICD-10-CM    1. Dizziness  R42       2. Constipation, unspecified constipation type  K59.00          Discharge Medications:  New Prescriptions    MAGNESIUM HYDROXIDE (MILK OF MAGNESIA) 400 MG/5ML SUSPENSION    Take 15 mLs by mouth daily as needed for constipation or heartburn    MECLIZINE (ANTIVERT) 25 MG TABLET    Take 2 tablets (50 mg) by mouth 3 times daily as needed for dizziness    PROCHLORPERAZINE (COMPAZINE) 10 MG TABLET    Take 1 tablet (10 mg) by mouth every 6 hours as needed for nausea or vomiting    SODIUM PHOSPHATE (FLEET PEDS) 3.5-9.5 GM/59ML ENEMA    Place 1 enema rectally daily as needed for constipation      Scribe Disclosure:  Abdirashid MESSINA, am serving as a scribe at 1:26 PM  on 9/9/2023 to document services personally performed by Johnny Hernandez PA-C based on my observations and the provider's statements to me.               Johnny Hernandez PA-C  09/09/23 2042     Detail Level: Zone Plan: Recommended UPF clothing and broad brim hats. Plan: Pt is to start Efudex to scalp in Winter months, possible after holidays. Initiate Treatment: fluorouracil 5 % topical cream \\nQuantity: 40.0 g  Days Supply: 30\\nSig: Apply thin layer Monday, Wednesday, and Friday to scalp for 3-4 weeks as tolerated.

## 2023-09-11 LAB
ATRIAL RATE - MUSE: 51 BPM
DIASTOLIC BLOOD PRESSURE - MUSE: NORMAL MMHG
INTERPRETATION ECG - MUSE: NORMAL
P AXIS - MUSE: 52 DEGREES
PR INTERVAL - MUSE: 202 MS
QRS DURATION - MUSE: 82 MS
QT - MUSE: 492 MS
QTC - MUSE: 453 MS
R AXIS - MUSE: 80 DEGREES
SYSTOLIC BLOOD PRESSURE - MUSE: NORMAL MMHG
T AXIS - MUSE: 65 DEGREES
VENTRICULAR RATE- MUSE: 51 BPM

## 2024-06-16 ENCOUNTER — HEALTH MAINTENANCE LETTER (OUTPATIENT)
Age: 59
End: 2024-06-16

## 2025-05-31 ENCOUNTER — HEALTH MAINTENANCE LETTER (OUTPATIENT)
Age: 60
End: 2025-05-31

## 2025-06-21 ENCOUNTER — HEALTH MAINTENANCE LETTER (OUTPATIENT)
Age: 60
End: 2025-06-21